# Patient Record
Sex: MALE | Race: WHITE | NOT HISPANIC OR LATINO | Employment: UNEMPLOYED | URBAN - METROPOLITAN AREA
[De-identification: names, ages, dates, MRNs, and addresses within clinical notes are randomized per-mention and may not be internally consistent; named-entity substitution may affect disease eponyms.]

---

## 2023-03-10 ENCOUNTER — HOSPITAL ENCOUNTER (EMERGENCY)
Facility: HOSPITAL | Age: 43
Discharge: HOME | End: 2023-03-10
Attending: FAMILY MEDICINE
Payer: MEDICAID

## 2023-03-10 VITALS
HEART RATE: 87 BPM | SYSTOLIC BLOOD PRESSURE: 124 MMHG | BODY MASS INDEX: 25.36 KG/M2 | DIASTOLIC BLOOD PRESSURE: 89 MMHG | HEIGHT: 73 IN | WEIGHT: 191.38 LBS | RESPIRATION RATE: 18 BRPM | TEMPERATURE: 97 F | OXYGEN SATURATION: 98 %

## 2023-03-10 DIAGNOSIS — F10.920 ALCOHOLIC INTOXICATION WITHOUT COMPLICATION: Primary | ICD-10-CM

## 2023-03-10 PROCEDURE — 99283 EMERGENCY DEPT VISIT LOW MDM: CPT
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
## 2024-02-25 ENCOUNTER — HOSPITAL ENCOUNTER (INPATIENT)
Facility: HOSPITAL | Age: 44
LOS: 5 days | Discharge: HOME | DRG: 885 | End: 2024-03-01
Attending: PSYCHIATRY & NEUROLOGY | Admitting: PSYCHIATRY & NEUROLOGY
Payer: MEDICAID

## 2024-02-25 DIAGNOSIS — F32.A DEPRESSION: ICD-10-CM

## 2024-02-25 PROCEDURE — 11400000 HC PSYCH PRIVATE ROOM

## 2024-02-25 PROCEDURE — 25000003 PHARM REV CODE 250: Performed by: PSYCHIATRY & NEUROLOGY

## 2024-02-25 PROCEDURE — S4991 NICOTINE PATCH NONLEGEND: HCPCS | Performed by: PSYCHIATRY & NEUROLOGY

## 2024-02-25 RX ORDER — DIPHENHYDRAMINE HYDROCHLORIDE 50 MG/ML
50 INJECTION INTRAMUSCULAR; INTRAVENOUS EVERY 4 HOURS PRN
Status: DISCONTINUED | OUTPATIENT
Start: 2024-02-25 | End: 2024-03-01 | Stop reason: HOSPADM

## 2024-02-25 RX ORDER — ONDANSETRON 4 MG/1
4 TABLET, ORALLY DISINTEGRATING ORAL EVERY 6 HOURS PRN
Status: DISCONTINUED | OUTPATIENT
Start: 2024-02-25 | End: 2024-03-01 | Stop reason: HOSPADM

## 2024-02-25 RX ORDER — HALOPERIDOL 5 MG/ML
10 INJECTION INTRAMUSCULAR EVERY 4 HOURS PRN
Status: DISCONTINUED | OUTPATIENT
Start: 2024-02-25 | End: 2024-03-01 | Stop reason: HOSPADM

## 2024-02-25 RX ORDER — ALUMINUM HYDROXIDE, MAGNESIUM HYDROXIDE, AND SIMETHICONE 1200; 120; 1200 MG/30ML; MG/30ML; MG/30ML
30 SUSPENSION ORAL EVERY 6 HOURS PRN
Status: DISCONTINUED | OUTPATIENT
Start: 2024-02-25 | End: 2024-03-01 | Stop reason: HOSPADM

## 2024-02-25 RX ORDER — HYDROXYZINE HYDROCHLORIDE 50 MG/1
50 TABLET, FILM COATED ORAL EVERY 4 HOURS PRN
Status: DISCONTINUED | OUTPATIENT
Start: 2024-02-25 | End: 2024-03-01 | Stop reason: HOSPADM

## 2024-02-25 RX ORDER — DIPHENHYDRAMINE HCL 50 MG
50 CAPSULE ORAL EVERY 4 HOURS PRN
Status: DISCONTINUED | OUTPATIENT
Start: 2024-02-25 | End: 2024-03-01 | Stop reason: HOSPADM

## 2024-02-25 RX ORDER — TRAZODONE HYDROCHLORIDE 100 MG/1
100 TABLET ORAL NIGHTLY PRN
Status: DISCONTINUED | OUTPATIENT
Start: 2024-02-25 | End: 2024-03-01 | Stop reason: HOSPADM

## 2024-02-25 RX ORDER — ACETAMINOPHEN 325 MG/1
650 TABLET ORAL EVERY 6 HOURS PRN
Status: DISCONTINUED | OUTPATIENT
Start: 2024-02-25 | End: 2024-03-01 | Stop reason: HOSPADM

## 2024-02-25 RX ORDER — ADHESIVE BANDAGE
30 BANDAGE TOPICAL DAILY PRN
Status: DISCONTINUED | OUTPATIENT
Start: 2024-02-25 | End: 2024-03-01 | Stop reason: HOSPADM

## 2024-02-25 RX ORDER — HALOPERIDOL 5 MG/1
10 TABLET ORAL EVERY 4 HOURS PRN
Status: DISCONTINUED | OUTPATIENT
Start: 2024-02-25 | End: 2024-03-01 | Stop reason: HOSPADM

## 2024-02-25 RX ORDER — LORAZEPAM 1 MG/1
2 TABLET ORAL EVERY 4 HOURS PRN
Status: DISCONTINUED | OUTPATIENT
Start: 2024-02-25 | End: 2024-03-01 | Stop reason: HOSPADM

## 2024-02-25 RX ORDER — IBUPROFEN 200 MG
1 TABLET ORAL DAILY
Status: DISCONTINUED | OUTPATIENT
Start: 2024-02-25 | End: 2024-03-01 | Stop reason: HOSPADM

## 2024-02-25 RX ADMIN — HYDROXYZINE HYDROCHLORIDE 50 MG: 50 TABLET, FILM COATED ORAL at 08:02

## 2024-02-25 RX ADMIN — NICOTINE 1 PATCH: 21 PATCH, EXTENDED RELEASE TRANSDERMAL at 05:02

## 2024-02-25 RX ADMIN — TRAZODONE HYDROCHLORIDE 100 MG: 100 TABLET ORAL at 08:02

## 2024-02-25 NOTE — PLAN OF CARE
Problem: Adult Inpatient Plan of Care  Goal: Plan of Care Review  Outcome: Ongoing, Not Progressing  Goal: Patient-Specific Goal (Individualized)  Outcome: Ongoing, Not Progressing  Goal: Absence of Hospital-Acquired Illness or Injury  Outcome: Ongoing, Not Progressing  Goal: Optimal Comfort and Wellbeing  Outcome: Ongoing, Not Progressing  Goal: Readiness for Transition of Care  Outcome: Ongoing, Not Progressing     Problem: Activity and Energy Impairment (Depressive Signs/Symptoms)  Goal: Optimized Energy Level (Depressive Signs/Symptoms)  Outcome: Ongoing, Not Progressing     Problem: Cognitive Impairment (Depressive Signs/Symptoms)  Goal: Optimized Cognitive Function  Outcome: Ongoing, Not Progressing     Problem: Decreased Participation/Engagement (Depressive Signs/Symptoms)  Goal: Increased Participation and Engagement (Depressive Signs/Symptoms)  Outcome: Ongoing, Not Progressing     Problem: Feelings of Worthlessness, Hopelessness or Excessive Guilt (Depressive Signs/Symptoms)  Goal: Enhanced Self-Esteem and Confidence (Depressive Signs/Symptoms)  Outcome: Ongoing, Not Progressing     Problem: Mood Impairment (Depressive Signs/Symptoms)  Goal: Improved Mood Symptoms (Depressive Signs/Symptoms)  Outcome: Ongoing, Not Progressing     Problem: Nutrition Imbalance (Depressive Signs/Symptoms)  Goal: Optimized Nutrition Intake  Outcome: Ongoing, Not Progressing     Problem: Psychomotor Impairment (Depressive Signs/Symptoms)  Goal: Improved Psychomotor Symptoms (Depressive Signs/Symptoms)  Outcome: Ongoing, Not Progressing     Problem: Sleep Disturbance (Depressive Signs/Symptoms)  Goal: Improved Sleep (Depressive Signs/Symptoms)  Outcome: Ongoing, Not Progressing     Problem: Social, Occupational or Functional Impairment (Depressive Signs/Symptoms)  Goal: Enhanced Social, Occupational or Functional Skills (Depressive Signs/Symptoms)  Outcome: Ongoing, Not Progressing     Problem: Activity and Energy Impairment  (Excessive Substance Use)  Goal: Optimized Energy Level (Excessive Substance Use)  Outcome: Ongoing, Not Progressing     Problem: Behavior Regulation Impairment (Excessive Substance Use)  Goal: Improved Behavioral Control (Excessive Substance Use)  Outcome: Ongoing, Not Progressing     Problem: Decreased Participation and Engagement (Excessive Substance Use)  Goal: Increased Participation and Engagement (Excessive Substance Use)  Outcome: Ongoing, Not Progressing     Problem: Physiologic Impairment (Excessive Substance Use)  Goal: Improved Physiologic Symptoms (Excessive Substance Use)  Outcome: Ongoing, Not Progressing     Problem: Social, Occupational or Functional Impairment (Excessive Substance Use)  Goal: Enhanced Social, Occupational or Functional Skills (Excessive Substance Use)  Outcome: Ongoing, Not Progressing

## 2024-02-25 NOTE — NURSING
"Admission Note:    Dequan Woods is a 43 y.o. male, : 1980, MRN: [id number], admitted on 2024 to [facility] ([facility]) for José Miguel Moulton MD with a diagnosis of Depression [F32.A]. Patient admitted on a status of Physician Emergency Certificate (PEC). Dequan reports no known food or drug allergies.    Patient demonstrated an affect that was sad, flat, and anxious. Patient demonstrated mood during assessment that was depressed and anxious. Patient had an appearance that was disheveled and poor hygiene.  Patient endorses suicidal ideation. Patient denies suicide plan. Patient denies hallucinations.    Dequan's  height is 6' 2" (1.88 m) and weight is 100.2 kg (221 lb). His temperature is 98.2 °F (36.8 °C). His blood pressure is 136/92 (abnormal) and his pulse is 90. His respiration is 20.     Dequan's last BM was noted on: 24.    Metal detector screening performed via security personnel. The result of the scan was no contraband found. Head-to-toe physical assessment completed with the following findings: no wounds found upon body screen. A full skin assessment was performed. Dequan's skin appeared warm,dry, and intact.  Dequan was oriented to unit, staff, peers, and room. Patient belongings/valuables stored in locked intake room cabinet and changes of clothing provided to patient. Dequan was placed on Q 15 min observations.       "

## 2024-02-26 PROBLEM — F10.10 ALCOHOL ABUSE, CONTINUOUS: Status: ACTIVE | Noted: 2024-02-26

## 2024-02-26 PROBLEM — F41.9 ANXIETY DISORDER: Status: ACTIVE | Noted: 2024-02-26

## 2024-02-26 PROBLEM — F33.2 MAJOR DEPRESSIVE DISORDER, RECURRENT, SEVERE WITHOUT PSYCHOTIC FEATURES: Status: ACTIVE | Noted: 2024-02-26

## 2024-02-26 LAB
ALBUMIN SERPL-MCNC: 4 G/DL (ref 3.5–5)
ALBUMIN/GLOB SERPL: 1.4 RATIO (ref 1.1–2)
ALP SERPL-CCNC: 99 UNIT/L (ref 40–150)
ALT SERPL-CCNC: 7 UNIT/L (ref 0–55)
AST SERPL-CCNC: 18 UNIT/L (ref 5–34)
BASOPHILS # BLD AUTO: 0.05 X10(3)/MCL
BASOPHILS NFR BLD AUTO: 0.5 %
BILIRUB SERPL-MCNC: 0.7 MG/DL
BUN SERPL-MCNC: 16.3 MG/DL (ref 8.9–20.6)
CALCIUM SERPL-MCNC: 9.5 MG/DL (ref 8.4–10.2)
CHLORIDE SERPL-SCNC: 102 MMOL/L (ref 98–107)
CHOLEST SERPL-MCNC: 166 MG/DL
CHOLEST/HDLC SERPL: 3 {RATIO} (ref 0–5)
CO2 SERPL-SCNC: 25 MMOL/L (ref 22–29)
CREAT SERPL-MCNC: 0.96 MG/DL (ref 0.73–1.18)
EOSINOPHIL # BLD AUTO: 0.12 X10(3)/MCL (ref 0–0.9)
EOSINOPHIL NFR BLD AUTO: 1.3 %
ERYTHROCYTE [DISTWIDTH] IN BLOOD BY AUTOMATED COUNT: 12.1 % (ref 11.5–17)
EST. AVERAGE GLUCOSE BLD GHB EST-MCNC: 96.8 MG/DL
GFR SERPLBLD CREATININE-BSD FMLA CKD-EPI: >60 MLS/MIN/1.73/M2
GLOBULIN SER-MCNC: 2.8 GM/DL (ref 2.4–3.5)
GLUCOSE SERPL-MCNC: 88 MG/DL (ref 74–100)
HBA1C MFR BLD: 5 %
HCT VFR BLD AUTO: 46.3 % (ref 42–52)
HDLC SERPL-MCNC: 60 MG/DL (ref 35–60)
HGB BLD-MCNC: 15.9 G/DL (ref 14–18)
IMM GRANULOCYTES # BLD AUTO: 0.03 X10(3)/MCL (ref 0–0.04)
IMM GRANULOCYTES NFR BLD AUTO: 0.3 %
LDLC SERPL CALC-MCNC: 84 MG/DL (ref 50–140)
LYMPHOCYTES # BLD AUTO: 3.06 X10(3)/MCL (ref 0.6–4.6)
LYMPHOCYTES NFR BLD AUTO: 32.7 %
MCH RBC QN AUTO: 32.1 PG (ref 27–31)
MCHC RBC AUTO-ENTMCNC: 34.3 G/DL (ref 33–36)
MCV RBC AUTO: 93.5 FL (ref 80–94)
MONOCYTES # BLD AUTO: 0.84 X10(3)/MCL (ref 0.1–1.3)
MONOCYTES NFR BLD AUTO: 9 %
NEUTROPHILS # BLD AUTO: 5.26 X10(3)/MCL (ref 2.1–9.2)
NEUTROPHILS NFR BLD AUTO: 56.2 %
NRBC BLD AUTO-RTO: 0 %
PLATELET # BLD AUTO: 276 X10(3)/MCL (ref 130–400)
PMV BLD AUTO: 9.7 FL (ref 7.4–10.4)
POTASSIUM SERPL-SCNC: 4.2 MMOL/L (ref 3.5–5.1)
PROT SERPL-MCNC: 6.8 GM/DL (ref 6.4–8.3)
RBC # BLD AUTO: 4.95 X10(6)/MCL (ref 4.7–6.1)
SODIUM SERPL-SCNC: 137 MMOL/L (ref 136–145)
T PALLIDUM AB SER QL: NONREACTIVE
TRIGL SERPL-MCNC: 109 MG/DL (ref 34–140)
TSH SERPL-ACNC: 1.2 UIU/ML (ref 0.35–4.94)
VLDLC SERPL CALC-MCNC: 22 MG/DL
WBC # SPEC AUTO: 9.36 X10(3)/MCL (ref 4.5–11.5)

## 2024-02-26 PROCEDURE — 11400000 HC PSYCH PRIVATE ROOM

## 2024-02-26 PROCEDURE — 84443 ASSAY THYROID STIM HORMONE: CPT | Performed by: PSYCHIATRY & NEUROLOGY

## 2024-02-26 PROCEDURE — 80053 COMPREHEN METABOLIC PANEL: CPT | Performed by: PSYCHIATRY & NEUROLOGY

## 2024-02-26 PROCEDURE — S4991 NICOTINE PATCH NONLEGEND: HCPCS | Performed by: PSYCHIATRY & NEUROLOGY

## 2024-02-26 PROCEDURE — 25000003 PHARM REV CODE 250: Performed by: PSYCHIATRY & NEUROLOGY

## 2024-02-26 PROCEDURE — 85025 COMPLETE CBC W/AUTO DIFF WBC: CPT | Performed by: PSYCHIATRY & NEUROLOGY

## 2024-02-26 PROCEDURE — 80061 LIPID PANEL: CPT | Performed by: PSYCHIATRY & NEUROLOGY

## 2024-02-26 PROCEDURE — 86780 TREPONEMA PALLIDUM: CPT | Performed by: PSYCHIATRY & NEUROLOGY

## 2024-02-26 PROCEDURE — 83036 HEMOGLOBIN GLYCOSYLATED A1C: CPT | Performed by: PSYCHIATRY & NEUROLOGY

## 2024-02-26 RX ORDER — PRAZOSIN HYDROCHLORIDE 1 MG/1
1 CAPSULE ORAL NIGHTLY
Status: DISCONTINUED | OUTPATIENT
Start: 2024-02-26 | End: 2024-03-01 | Stop reason: HOSPADM

## 2024-02-26 RX ORDER — BUSPIRONE HYDROCHLORIDE 5 MG/1
10 TABLET ORAL 2 TIMES DAILY
Status: DISCONTINUED | OUTPATIENT
Start: 2024-02-26 | End: 2024-02-28

## 2024-02-26 RX ORDER — QUETIAPINE FUMARATE 300 MG/1
300 TABLET, FILM COATED ORAL NIGHTLY
Status: DISCONTINUED | OUTPATIENT
Start: 2024-02-26 | End: 2024-03-01 | Stop reason: HOSPADM

## 2024-02-26 RX ADMIN — SERTRALINE HYDROCHLORIDE 125 MG: 50 TABLET ORAL at 08:02

## 2024-02-26 RX ADMIN — BUSPIRONE HYDROCHLORIDE 10 MG: 5 TABLET ORAL at 08:02

## 2024-02-26 RX ADMIN — QUETIAPINE FUMARATE 300 MG: 300 TABLET ORAL at 08:02

## 2024-02-26 RX ADMIN — PRAZOSIN HYDROCHLORIDE 1 MG: 1 CAPSULE ORAL at 08:02

## 2024-02-26 RX ADMIN — TRAZODONE HYDROCHLORIDE 100 MG: 100 TABLET ORAL at 09:02

## 2024-02-26 RX ADMIN — HYDROXYZINE HYDROCHLORIDE 50 MG: 50 TABLET, FILM COATED ORAL at 05:02

## 2024-02-26 RX ADMIN — NICOTINE 1 PATCH: 21 PATCH, EXTENDED RELEASE TRANSDERMAL at 08:02

## 2024-02-26 RX ADMIN — HYDROXYZINE HYDROCHLORIDE 50 MG: 50 TABLET, FILM COATED ORAL at 08:02
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
## 2024-02-26 NOTE — PLAN OF CARE
Problem: Adult Inpatient Plan of Care  Goal: Plan of Care Review  Outcome: Ongoing, Progressing  Goal: Optimal Comfort and Wellbeing  Outcome: Ongoing, Progressing     Problem: Activity and Energy Impairment (Depressive Signs/Symptoms)  Goal: Optimized Energy Level (Depressive Signs/Symptoms)  Outcome: Ongoing, Progressing  Intervention: Optimize Energy Level  Flowsheets (Taken 2/26/2024 0852)  Activity (Behavioral Health): activity encouraged     Problem: Cognitive Impairment (Depressive Signs/Symptoms)  Goal: Optimized Cognitive Function  Outcome: Ongoing, Progressing     Problem: Decreased Participation/Engagement (Depressive Signs/Symptoms)  Goal: Increased Participation and Engagement (Depressive Signs/Symptoms)  Outcome: Ongoing, Progressing  Intervention: Facilitate Participation and Engagement  Flowsheets (Taken 2/26/2024 0852)  Supportive Measures:   active listening utilized   decision-making supported   goal-setting facilitated   self-care encouraged     Problem: Feelings of Worthlessness, Hopelessness or Excessive Guilt (Depressive Signs/Symptoms)  Goal: Enhanced Self-Esteem and Confidence (Depressive Signs/Symptoms)  Outcome: Ongoing, Progressing  Intervention: Promote Confidence and Self-Esteem  Flowsheets (Taken 2/26/2024 0852)  Supportive Measures:   active listening utilized   decision-making supported   goal-setting facilitated   self-care encouraged     Problem: Mood Impairment (Depressive Signs/Symptoms)  Goal: Improved Mood Symptoms (Depressive Signs/Symptoms)  Outcome: Ongoing, Progressing  Intervention: Promote Mood Improvement  Flowsheets (Taken 2/26/2024 0852)  Supportive Measures:   active listening utilized   decision-making supported   goal-setting facilitated   self-care encouraged     Problem: Nutrition Imbalance (Depressive Signs/Symptoms)  Goal: Optimized Nutrition Intake  Outcome: Ongoing, Progressing  Flowsheets (Taken 2/26/2024 0852)  Mutually Determined Action Steps (Optimized  Nutrition Intake):   maintains regular elimination   eats at meal time  Intervention: Promote Optimal Nutrition Intake  Flowsheets (Taken 2/26/2024 0852)  Bowel Elimination Promotion:   adequate fluid intake promoted   ambulation promoted     Problem: Psychomotor Impairment (Depressive Signs/Symptoms)  Goal: Improved Psychomotor Symptoms (Depressive Signs/Symptoms)  Outcome: Ongoing, Progressing  Intervention: Manage Psychomotor Movement  Flowsheets (Taken 2/26/2024 0852)  Activity (Behavioral Health): activity encouraged     Problem: Sleep Disturbance (Depressive Signs/Symptoms)  Goal: Improved Sleep (Depressive Signs/Symptoms)  Outcome: Ongoing, Progressing     Problem: Social, Occupational or Functional Impairment (Depressive Signs/Symptoms)  Goal: Enhanced Social, Occupational or Functional Skills (Depressive Signs/Symptoms)  Outcome: Ongoing, Progressing  Intervention: Promote Social, Occupational and Functional Ability  Flowsheets (Taken 2/26/2024 0852)  Social Functional Ability Promotion:   autonomy promoted   opportunity for activity provided     Problem: Activity and Energy Impairment (Excessive Substance Use)  Goal: Optimized Energy Level (Excessive Substance Use)  Outcome: Ongoing, Progressing

## 2024-02-26 NOTE — NURSING
"PRN Medication Administration f/u:    Dequan Woods response to medication, a decrease in anxiety. Dequan states, "It has taken the edge off a little bit".   "

## 2024-02-26 NOTE — NURSING
Daily Nursing Note:        Behavior:     Patient Dequan Woods is a 43 y.o. male, : 1980, MRN: [id number], admitted on 2024 to [facility] ([facility]) for José Miguel Moulton MD with a diagnosis of Depression [F32.A]. Patient admitted on a status of Physician Emergency Certificate (PEC). Dequan reports no known food or drug allergies.    Dequan Woods denies any suicidal or homicidal ideation. Dequan Woods reports depression and anxiety. Dequan Woods denies hallucinations no observations of delusions at this time.            Intervention:     Encourage Dequan Woods to perform self-hygiene, grooming, and changing of clothing. Monitor Dequan Woods behavior and program compliance. Monitor Dequan Woods for suicidal ideation, homicidal ideation, sleep disturbance, and hallucinations. Encourage Monitor Dequan Woods for intake and output to ensure hydration. Notify the Physician/Physician Assistant/Advance Practice Registered Nurse (MD/PA/APRN) for any medication refusal and any change in patient condition.        Response:     Dequan Woods verbalizes understand of unit process and procedures.        Plan:      Continue to monitor per MD/PA/APRN orders; give prn medication , as appropriate and maintain patient and staff safety.

## 2024-02-26 NOTE — NURSING
PRN Medication Administration f/u:    Dequan Woods response to medication relief of anxiety, resting.

## 2024-02-26 NOTE — PROGRESS NOTES
Dequan is a 43 yr. male admitted for Major Depressive Disorder, recurrent, severe (F33.2), Anxiety (F41.9), and Alcohol use disorder (F10.10) with a +uds cannabis. STRS/CTRS met with pt 1:1, Dequan was cooperative, appeared depressed reporting constant worrying and poor self esteem but reported ability to perform his ADL's. STRS/CTRS educated pt to TR group dates and times with Dequan agreeing to attend TR group. Dequan reported his treatment goal as coping skills and stress management.        02/26/24 1145   General   Admit Date 02/25/24   Primary Diagnosis Major Depressive Disorder, recurrent, severe (F33.2)  Anxiety (F41.9)   Secondary Diagnosis Alcohol use disorder (F10.10)   Mosque Rastafarian   Number of Children 2   Children Living? 2   Occupation landscaping   Does the patient have dentures? No   If you were to take part in activities, which of the following would you prefer? Both   Do you feel like you have enough to keep you busy now? Yes   Do you believe that you have the opportunity for physical activity? Yes   Activity Capabilities Moderate   Subjective   Patient states I relapsed on drinking   Assessment   Mobility ambulates independently   Transfers independently   Musculoskeletal pain  (c/o lower back pain)   Visual Acuity normal vision   Visual Perception depth perception;color perception;recognizes numbers;recognizes letters   Hearing normal   Speech/Communication normal   Cognitive Concerns oriented x4   Emotional Concerns appears depressed;poor self image;critical of self or others   Leisure Interest Survey   Leisure Interest Survey Yes   Social/Group Activities   Mosque/Jain Would like to learn/do   Restaurant Current Interest   Solitary Activities   Music Listening Current Interest   Reading Past Interest   Physical Activities   Fitness/Exercise Programs Would like to learn/do   Creative Activities   Drawing Current Interest   Cooking Current Interest   Outdoor Activities   Fishing Current  Interest   Camping Current Interest   Passive Games   Classic Board Games Current Interest   BinTopic Current Interest   Card Games Current Interest   Goals   Additional Documentation yes   Goal Formulation With patient   Time For Goal Achievement 7 days   Goal 1 coping skils and stress management   Goal 1-Progress ongoing- progressing   Plan   Planned Therapy Intervention Group Recreational Therapy   Expected Length of Stay 5-7 days   PT Frequency Minimum of 3 visits per week

## 2024-02-26 NOTE — NURSING
PRN Medication Administration:    Dequan Woods c/o anxiety and depression. Dequan states he takes Seroquel to sleep at home. PRN trazodone 100 mg tablet and hydroxyzine 50 mg tablet administered per physician order. Dequan Chuck tolerated administration. Monitor for effectiveness of medication and f/u.

## 2024-02-26 NOTE — H&P
[facility] - Behavioral Health Unit  History & Physical    Subjective:      Chief Complaint/Reason for Admission: major depression/ SI     Dequan Woods is a 43 y.o. male. Suicidal ideation / alcohol abuse     No past medical history on file.  No past surgical history on file.  No family history on file.       No medications prior to admission.     Review of patient's allergies indicates:  No Known Allergies     Review of Systems   Constitutional: Negative.    HENT: Negative.     Eyes: Negative.    Respiratory: Negative.     Cardiovascular: Negative.    Gastrointestinal: Negative.    Genitourinary: Negative.    Musculoskeletal: Negative.    Skin: Negative.    Neurological: Negative.    Endo/Heme/Allergies: Negative.    Psychiatric/Behavioral:  Positive for depression, substance abuse and suicidal ideas. Negative for hallucinations.        Objective:      Vital Signs (Most Recent)  Temp: 97.3 °F (36.3 °C) (02/25/24 1915)  Pulse: 82 (02/25/24 1915)  Resp: 18 (02/25/24 1915)  BP: (!) 143/89 (02/25/24 1915)  SpO2: 95 % (02/25/24 1915)    Vital Signs Range (Last 24H):  Temp:  [97.3 °F (36.3 °C)-98.2 °F (36.8 °C)]   Pulse:  [82-90]   Resp:  [18-20]   BP: (136-143)/(89-92)   SpO2:  [95 %]     Physical Exam  HENT:      Head: Normocephalic.      Right Ear: Tympanic membrane normal.      Left Ear: Tympanic membrane normal.      Nose: Nose normal.      Mouth/Throat:      Mouth: Mucous membranes are moist.   Eyes:      Extraocular Movements: Extraocular movements intact.      Pupils: Pupils are equal, round, and reactive to light.   Cardiovascular:      Rate and Rhythm: Normal rate and regular rhythm.   Pulmonary:      Effort: Pulmonary effort is normal.   Abdominal:      General: Abdomen is flat.   Musculoskeletal:         General: Normal range of motion.   Skin:     General: Skin is warm.   Neurological:      General: No focal deficit present.      Mental Status: He is alert and oriented to person, place, and  time.      Comments: Vision normal   Hearing normal   EOM intact   Face muscles normal  Facial sensation normal   Shrugs shoulders  Tongue midline            Data Review:    Recent Results (from the past 48 hour(s))   ETHANOL,U    Collection Time: 02/25/24 11:47 AM   Result Value Ref Range    Alcohol Scrn 25.1 (H) 0.0 - 10.0 MG/DL        No results found.       Assessment and Plan       Major depression with suicidal ideation/ alcohol abuse

## 2024-02-26 NOTE — NURSING
"Nursing assessment completed and charted. Dequan Woods denies needs at this time. Dequan Woods denies command hallucinations. Dequan Woods verbalizes anxiety and depression. Dequan states he feels "down and out, a little bit of both right now". Dequan Woods denies suicidal ideation and homicidal ideation. Dequan Woods verbalizes understanding of unit process and procedures. Maintain patient safety and monitor per MD/PA/APRN orders.  "

## 2024-02-26 NOTE — NURSING
PRN Medication Administration:     Dequan Woods c/o anxiety.Hydroxyzine 50 mg tablet administered per physician order. Dequan tolerated administration. Monitor for effectiveness of medication and f/u.

## 2024-02-26 NOTE — PLAN OF CARE
Behavioral Health Unit  Psychosocial History and Assessment  Progress Note      Patient Name: Dequan Woods YOB: 1980 SW: Karen Dejesus Date: 2/26/2024    Chief Complaint: addictive disorder and depression    Consent:     Did the patient consent for an interview with the ? Yes    Did the patient consent for the  to contact family/friend/caregiver?   Yes  Name: Marianela White, Relationship: wife, and Contact: [phone]    Did the patient give consent for the  to inform family/friend/caregiver of his/her whereabouts or to discuss discharge planning? Yes    Source of Information: Face to face with patient    Is information obtained from interviews considered reliable?   yes    Reason for Admission:     Active Hospital Problems    Diagnosis  POA    *Major depressive disorder, recurrent, severe without psychotic features [F33.2]  Unknown    Anxiety disorder [F41.9]  Unknown    Alcohol abuse, continuous [F10.10]  Unknown      Resolved Hospital Problems   No resolved problems to display.       History of Present Illness - (Patient Perception):   I had gotten a good job after I got out of rehab; after three weeks I bought a half pint. My wife found out about it and kicked me out of the house, so I didn't have anywhere to go. I wanted to get help before it got worse. This time I was drinking about a half pint every other day where as last time it was about a 5th a day. I started having really bad depression and feeling worthless.     Present biopsychosocial functioning: depressed    Past biopsychosocial functioning: history of opioid abuse and etoh abuse    Family and Marital/Relationship History:     Significant Other/Partner Relationships:  : common law for 10 years; 2 stepsons and 2 biological    Family Relationships: Intact      Childhood History:     Where was patient raised? [place]    Who raised the patient? dad      How does patient describe  their childhood? It was pretty good, dad was a little strict, just tough love; mom was on drugs siblings were adopted      Who is patient's primary support person? isabel      Culture and Hinduism:     Hinduism: Latter day    How strong of a role does Yazidi and spirituality play in patient's life? Pretty strong    Jainism or spiritual concerns regarding treatment: observation of holy days  and spiritual concerns / distress    History of Abuse:   History of Abuse: Denies      Outcome: denies    Psychiatric and Medical History:     History of psychiatric illness or treatment: prior inpatient treatment, psychotropic management by PCP, has participated in counseling/psychotherapy on an outpatient basis in the past, and [facility] [place]    Medical history: No past medical history on file.    Substance Abuse History:     Alcohol - (Patient Perspective): pt states that he was sober for 3 months, and relapsed about 3 weeks ago. He stated that he was drinking a pint every two days  Social History     Substance and Sexual Activity   Alcohol Use None       Drugs - (Patient Perspective): pt states that he smokes marijuana daily  Social History     Substance and Sexual Activity   Drug Use Not on file       Education:     Currently Enrolled? No  Attended College/Technical School    Special Education? No    Interested in Completing Education/GED: No    Employment and Financial:     Currently employed? Employed: Current Occupation: [employer]    Source of Income: salary    Able to afford basic needs (food, shelter, utilities)? Yes    Who manages finances/personal affairs? self      Service:     [place]? no    Combat Service? No     Community Resources:     Describe present use of community resources: inpatient and outpatient mental health     Identify previously used community resources   (Include previous mental health treatment - outpatient and inpatient): inpatient and outpatient mental  health    Environmental:     Current living situation:Lives with spouse    Social Evaluation:     Patient Assets: General fund of knowledge, Motivation for treatment/growth, Work skills, Physical health, and Ability for insight    Patient Limitations: poor coping skills    High risk psychosocial issues that may impact discharge planning:   None at this time    Recommendations:     Anticipated discharge plan:   outpatient follow up; rehab    High risk issues requiring early treatment planning and immediate intervention: none at this time    Community resources needed for discharge planning:  aftercare treatment sources    Anticipated social work role(s) in treatment and discharge planning: advice and [place], groups, individual as needed, referral to aftercare.    02/26/24 0842   Initial Information   Source of Information patient   Reason for Admission etoh abuse   Arrived From emergency department   Spiritual Beliefs   Spiritual, Cultural Beliefs, Muslim Practices, Values that Affect Care yes   Description of Beliefs that Will Affect Care Jewish   Substance Use/Withdrawal   Substance Use Current, used prior to admission   Additional Tobacco Use   How many cigarettes do you typically have per day? 10   Abuse Screen (yes response referral indicated)   Feels Unsafe at Home or Work/School no   Feels Threatened by Someone no   Does anyone try to keep you from having contact with others or doing things outside your home? no   Physical Signs of Abuse Present no   Abuse Details   Physical Abuse No   Sexual Abuse No   Emotional Abuse No   If applicable, has the abuse been reported? No   AUDIT-C (Alcohol Use Disorders ID Test)   Alcohol Use In Past Year 3-->two to three times per week   Alcohol Amount Per Day In Past Year 2-->five or six   More Than 6 Drinks On One Occasion In Past Year 3-->weekly   Total Audit C Score 8

## 2024-02-27 PROCEDURE — 11400000 HC PSYCH PRIVATE ROOM

## 2024-02-27 PROCEDURE — S4991 NICOTINE PATCH NONLEGEND: HCPCS | Performed by: PSYCHIATRY & NEUROLOGY

## 2024-02-27 PROCEDURE — 25000003 PHARM REV CODE 250: Performed by: PSYCHIATRY & NEUROLOGY

## 2024-02-27 RX ADMIN — HYDROXYZINE HYDROCHLORIDE 50 MG: 50 TABLET, FILM COATED ORAL at 08:02

## 2024-02-27 RX ADMIN — BUSPIRONE HYDROCHLORIDE 10 MG: 5 TABLET ORAL at 08:02

## 2024-02-27 RX ADMIN — QUETIAPINE FUMARATE 300 MG: 300 TABLET ORAL at 08:02

## 2024-02-27 RX ADMIN — PRAZOSIN HYDROCHLORIDE 1 MG: 1 CAPSULE ORAL at 08:02

## 2024-02-27 RX ADMIN — NICOTINE 1 PATCH: 21 PATCH, EXTENDED RELEASE TRANSDERMAL at 08:02

## 2024-02-27 RX ADMIN — TRAZODONE HYDROCHLORIDE 100 MG: 100 TABLET ORAL at 08:02

## 2024-02-27 RX ADMIN — SERTRALINE HYDROCHLORIDE 125 MG: 50 TABLET ORAL at 08:02

## 2024-02-27 NOTE — NURSING
"PRN Medication Follow-up Note:    Behavior:    Patient (Dequan Woods is a 43 y.o. male, : 1980, MRN: [id number])     Allergies: Patient has no known allergies.    Dequan's  height is 6' 2" (1.88 m) and weight is 100.2 kg (221 lb). His temperature is 98.1 °F (36.7 °C). His blood pressure is 146/88 (abnormal) and his pulse is 79. His respiration is 16 and oxygen saturation is 97%.     Administered atarax_______ per physician order to Dequan       Intervention:    Intervention to Dequan's response: none needed_________.       Response:    Dequan's response: no furthr c/o voiced_________      Plan:     Continue to monitor per MD/PA/APRN orders; and reevaluate medication effectiveness within 30 minutes.   "

## 2024-02-27 NOTE — PLAN OF CARE
Problem: Adult Inpatient Plan of Care  Goal: Plan of Care Review  Outcome: Ongoing, Progressing  Goal: Patient-Specific Goal (Individualized)  Outcome: Ongoing, Progressing  Goal: Absence of Hospital-Acquired Illness or Injury  Outcome: Ongoing, Progressing  Goal: Optimal Comfort and Wellbeing  Outcome: Ongoing, Progressing  Goal: Readiness for Transition of Care  Outcome: Ongoing, Progressing     Problem: Activity and Energy Impairment (Depressive Signs/Symptoms)  Goal: Optimized Energy Level (Depressive Signs/Symptoms)  Outcome: Ongoing, Progressing     Problem: Cognitive Impairment (Depressive Signs/Symptoms)  Goal: Optimized Cognitive Function  Outcome: Ongoing, Progressing     Problem: Decreased Participation/Engagement (Depressive Signs/Symptoms)  Goal: Increased Participation and Engagement (Depressive Signs/Symptoms)  Outcome: Ongoing, Progressing     Problem: Feelings of Worthlessness, Hopelessness or Excessive Guilt (Depressive Signs/Symptoms)  Goal: Enhanced Self-Esteem and Confidence (Depressive Signs/Symptoms)  Outcome: Ongoing, Progressing     Problem: Mood Impairment (Depressive Signs/Symptoms)  Goal: Improved Mood Symptoms (Depressive Signs/Symptoms)  Outcome: Ongoing, Progressing     Problem: Nutrition Imbalance (Depressive Signs/Symptoms)  Goal: Optimized Nutrition Intake  Outcome: Ongoing, Progressing     Problem: Psychomotor Impairment (Depressive Signs/Symptoms)  Goal: Improved Psychomotor Symptoms (Depressive Signs/Symptoms)  Outcome: Ongoing, Progressing     Problem: Sleep Disturbance (Depressive Signs/Symptoms)  Goal: Improved Sleep (Depressive Signs/Symptoms)  Outcome: Ongoing, Progressing     Problem: Social, Occupational or Functional Impairment (Depressive Signs/Symptoms)  Goal: Enhanced Social, Occupational or Functional Skills (Depressive Signs/Symptoms)  Outcome: Ongoing, Progressing     Problem: Activity and Energy Impairment (Excessive Substance Use)  Goal: Optimized Energy Level  (Excessive Substance Use)  Outcome: Ongoing, Progressing     Problem: Behavior Regulation Impairment (Excessive Substance Use)  Goal: Improved Behavioral Control (Excessive Substance Use)  Outcome: Ongoing, Progressing     Problem: Decreased Participation and Engagement (Excessive Substance Use)  Goal: Increased Participation and Engagement (Excessive Substance Use)  Outcome: Ongoing, Progressing     Problem: Physiologic Impairment (Excessive Substance Use)  Goal: Improved Physiologic Symptoms (Excessive Substance Use)  Outcome: Ongoing, Progressing     Problem: Social, Occupational or Functional Impairment (Excessive Substance Use)  Goal: Enhanced Social, Occupational or Functional Skills (Excessive Substance Use)  Outcome: Ongoing, Progressing

## 2024-02-27 NOTE — GROUP NOTE
Group Psychotherapy       Group Focus: Promoting Healthy Lifestyles   Group topic: Treatment Planning. Therapist explored patients need for identifying personal strengths and qualities in working towards mental health goals. Patients were educated on discharge planning and engaged in open discussion about how effective discharge planning can help decrease potential for inpatient treatment.      Number of patients in attendance: 4    Group Start Time: 1045  Group End Time:  1130  Groups Date: 2/26/2024  Group Topic:  Behavioral Health  Group Department: [facility] - Behavioral Health Unit  Group Facilitators:  Mag Campos MSW  _____________________________________________________________________    Patient Name: Dequan Woods  MRN: [id number]  Patient Class: IP- Psych   Admission Date\Time: 2/25/2024  3:00 PM  Hospital Length of Stay: 2  Primary Care Provider: Miranda Alas MD     Referred by: Acute Psychiatry Unit Treatment Team     Target symptoms: Alcohol Abuse, Depression, Anxiety, and Poor Coping Skills     Patient's response to treatment: Active Listening, Self-disclosure, and Frequent Questions     Progress toward goals: Progressing slowly     Interval History:      Diagnosis:      Plan: Continue treatment on [facility]

## 2024-02-27 NOTE — NURSING
"Daily Nursing Note:      Behavior:    Patient (Dequan Woods is a 43 y.o. male, : 1980, MRN: [id number]) demonstrating an affect that was flat and anxious. Dequan demonstrating mood that is anxious. Dequan had an appearance that was disheveled. Dequan denies suicidal ideation. Dequan denies suicide plan. Dequan denies homicidal ideation. Dequan denies hallucinations.    Dequan's  height is 6' 2" (1.88 m) and weight is 100.2 kg (221 lb). His temperature is 98.1 °F (36.7 °C). His blood pressure is 146/88 (abnormal) and his pulse is 79. His respiration is 16 and oxygen saturation is 97%.     Dequan's last BM was noted on: _______      Intervention:    Encourage Dequan to perform self-hygiene, grooming, and changing of clothing. Monitor Dequan's behavior and program compliance. Monitor Dequan for suicidal ideation, homicidal ideation, sleep disturbance, and hallucinations. Encourage Dequan to eat all portions of meals and assess for meal preferences. Monitor Dequan for intake and output to ensure hydration. Notify the Physician/Physician Assistant/Advance Practice Registered Nurse (MD/PA/APRN) for any medication refusal and any change in patient condition.      Response:    Dequan verbalizes understand of unit process and procedures. Dequan reported medications ______.      Plan:     Continue to monitor per MD/PA/APRN orders; maintain patient safety.   "

## 2024-02-27 NOTE — PROGRESS NOTES
02/27/24 1000   [facility] Group Therapy   Group Name Therapeutic Recreation   Specific Interventions Skilled Activity Mild Exercises   Participation Level None   Participation Quality Refused

## 2024-02-27 NOTE — PROGRESS NOTES
"2/27/2024  Dequan Woods   1980   [id number]        Psychiatry Progress Note     Chief Complaint: Im OK    SUBJECTIVE:   Dequan Woods is a 43 y.o. male  placed under a PEC at [facility] due to reported suicidal ideation.     Today patient stated that he is feeling better but was initially irritated. I was able to talk with him and he endorsed that he was having some self defeating thoughts. He endorsed feeling down because of his decisions. He does not currently show signs of withdrawal. BP somewhat elevated but other VSS. No tremors, sweating, or GI issues. Patient states that he is sleeping and eating "fine". Mood remains low and affect is constricted. Will continue with current POC and monitor for mood improvements.    Patient interested in both rehab and sober living. Will follow up with staff on placement.        Current Medications:   Scheduled Meds:    busPIRone  10 mg Oral BID    nicotine  1 patch Transdermal Daily    prazosin  1 mg Oral QHS    QUEtiapine  300 mg Oral QHS    sertraline  125 mg Oral Daily      PRN Meds: acetaminophen, aluminum-magnesium hydroxide-simethicone, haloperidoL **AND** diphenhydrAMINE **AND** LORazepam **AND** haloperidol lactate **AND** diphenhydrAMINE, hydrOXYzine HCL, magnesium hydroxide 400 mg/5 ml, ondansetron, traZODone   Psychotherapeutics (From admission, onward)      Start     Stop Route Frequency Ordered    02/26/24 2100  QUEtiapine tablet 300 mg         -- Oral Nightly 02/26/24 0741    02/26/24 0900  sertraline tablet 125 mg         -- Oral Daily 02/26/24 0741    02/26/24 0900  busPIRone tablet 10 mg         -- Oral 2 times daily 02/26/24 0741    02/25/24 1507  haloperidoL tablet 10 mg  (Med - Acute  Behavioral Management)        See Hyperspace for full Linked Orders Report.    -- Oral Every 4 hours PRN 02/25/24 1507    02/25/24 1507  LORazepam tablet 2 mg  (Med - Acute  Behavioral Management)        See Hyperspace for full Linked Orders Report.    -- Oral Every " 4 hours PRN 02/25/24 1507    02/25/24 1507  haloperidol lactate injection 10 mg  (Med - Acute  Behavioral Management)        See Hyperspace for full Linked Orders Report.    -- IM Every 4 hours PRN 02/25/24 1507    02/25/24 1507  traZODone tablet 100 mg         -- Oral Nightly PRN 02/25/24 1507            Allergies:   Review of patient's allergies indicates:  No Known Allergies     OBJECTIVE:   Vitals   Vitals:    02/27/24 0701   BP: (!) 144/87   Pulse: 83   Resp: 18   Temp: 97.7 °F (36.5 °C)        Labs/Imaging/Studies:   Recent Results (from the past 36 hour(s))   Hemoglobin A1C    Collection Time: 02/26/24  7:38 AM   Result Value Ref Range    Hemoglobin A1c 5.0 <=7.0 %    Estimated Average Glucose 96.8 mg/dL   Comprehensive Metabolic Panel    Collection Time: 02/26/24  7:38 AM   Result Value Ref Range    Sodium Level 137 136 - 145 mmol/L    Potassium Level 4.2 3.5 - 5.1 mmol/L    Chloride 102 98 - 107 mmol/L    Carbon Dioxide 25 22 - 29 mmol/L    Glucose Level 88 74 - 100 mg/dL    Blood Urea Nitrogen 16.3 8.9 - 20.6 mg/dL    Creatinine 0.96 0.73 - 1.18 mg/dL    Calcium Level Total 9.5 8.4 - 10.2 mg/dL    Protein Total 6.8 6.4 - 8.3 gm/dL    Albumin Level 4.0 3.5 - 5.0 g/dL    Globulin 2.8 2.4 - 3.5 gm/dL    Albumin/Globulin Ratio 1.4 1.1 - 2.0 ratio    Bilirubin Total 0.7 <=1.5 mg/dL    Alkaline Phosphatase 99 40 - 150 unit/L    Alanine Aminotransferase 7 0 - 55 unit/L    Aspartate Aminotransferase 18 5 - 34 unit/L    eGFR >60 mls/min/1.73/m2   Lipid Panel    Collection Time: 02/26/24  7:38 AM   Result Value Ref Range    Cholesterol Total 166 <=200 mg/dL    HDL Cholesterol 60 35 - 60 mg/dL    Triglyceride 109 34 - 140 mg/dL    Cholesterol/HDL Ratio 3 0 - 5    Very Low Density Lipoprotein 22     LDL Cholesterol 84.00 50.00 - 140.00 mg/dL   SYPHILIS ANTIBODY (WITH REFLEX RPR)    Collection Time: 02/26/24  7:38 AM   Result Value Ref Range    Syphilis Antibody Nonreactive Nonreactive, Equivocal   TSH    Collection  Time: 02/26/24  7:38 AM   Result Value Ref Range    TSH 1.200 0.350 - 4.940 uIU/mL   CBC with Differential    Collection Time: 02/26/24  7:38 AM   Result Value Ref Range    WBC 9.36 4.50 - 11.50 x10(3)/mcL    RBC 4.95 4.70 - 6.10 x10(6)/mcL    Hgb 15.9 14.0 - 18.0 g/dL    Hct 46.3 42.0 - 52.0 %    MCV 93.5 80.0 - 94.0 fL    MCH 32.1 (H) 27.0 - 31.0 pg    MCHC 34.3 33.0 - 36.0 g/dL    RDW 12.1 11.5 - 17.0 %    Platelet 276 130 - 400 x10(3)/mcL    MPV 9.7 7.4 - 10.4 fL    Neut % 56.2 %    Lymph % 32.7 %    Mono % 9.0 %    Eos % 1.3 %    Basophil % 0.5 %    Lymph # 3.06 0.6 - 4.6 x10(3)/mcL    Neut # 5.26 2.1 - 9.2 x10(3)/mcL    Mono # 0.84 0.1 - 1.3 x10(3)/mcL    Eos # 0.12 0 - 0.9 x10(3)/mcL    Baso # 0.05 <=0.2 x10(3)/mcL    IG# 0.03 0 - 0.04 x10(3)/mcL    IG% 0.3 %    NRBC% 0.0 %          Medical Review Of Systems:  A comprehensive review of systems was negative.      Psychiatric Mental Status Exam:  General Appearance: appears stated age, well-developed, well-nourished  Arousal: alert  Behavior: cooperative  Movements and Motor Activity: no abnormal involuntary movements noted  Orientation: oriented to person, place, time, and situation  Speech: normal rate, normal rhythm, normal volume, normal tone  Mood: Depressed  Affect: mood-congruent, constricted  Thought Process: linear  Associations: intact  Thought Content and Perceptions: (+) recent suicidal ideation, no homicidal ideation, no auditory hallucinations, no visual hallucinations, no paranoid ideation, no ideas of reference  Recent and Remote Memory: recent memory intact, remote memory intact; per interview/observation with patient  Attention and Concentration: intact, attentive to conversation; per interview/observation with patient  Fund of Knowledge: intact, aware of current events, vocabulary appropriate; based on history, vocabulary, fund of knowledge, syntax, grammar, and content  Insight: fair; based on understanding of severity of illness and  HPI  Judgment: fair; based on patient's behavior and HPI    ASSESSMENT/PLAN:   Problems Addressed/Diagnoses:  Major Depressive Disorder, recurrent, severe (F33.2)  Anxiety (F41.9)  Alcohol use disorder (F10.10)    No past medical history on file.     Plan:  Depression, chronic with acute exacerbation  -Continue Zoloft  -Continue Seroquel     Anxiety  -Buspar 10mg BID     Alcohol use, chronic with acute exacerbation  -Group/Individual psychotherapy  -Will monitor     -Ok to continue prazosin    Expected Disposition Plan: Substance treatment program        Doug DUMAS-BC

## 2024-02-27 NOTE — NURSING
"PRN Administration Note:    Behavior:    Patient (Dequan Woods is a 43 y.o. male, : 1980, MRN: [id number])     Allergies: Patient has no known allergies.    Dequan's  height is 6' 2" (1.88 m) and weight is 100.2 kg (221 lb). His temperature is 98.1 °F (36.7 °C). His blood pressure is 146/88 (abnormal) and his pulse is 79. His respiration is 16 and oxygen saturation is 97%.     Reason for PRN Administration: anxiety_________.    Intervention:    Administered atarax_______ per physician order to Dequan       Response:    Dequan tolerated administration well.      Plan:     Continue to monitor per MD/PA/APRN orders; and reevaluate medication effectiveness within 30 minutes.   "

## 2024-02-27 NOTE — NURSING
"Daily Nursing Note:      Behavior:    Patient (Dequan Woods is a 43 y.o. male, : 1980, MRN: [id number]) demonstrating an affect that was flat and anxious. Dequan demonstrating mood that is depressed and anxious. Dequan had an appearance that was disheveled. Dequan denies suicidal ideation. Dequan denies suicide plan. Dequan denies homicidal ideation. Dequan denies hallucinations.    Dequan's  height is 6' 2" (1.88 m) and weight is 100.2 kg (221 lb). His oral temperature is 98.1 °F (36.7 °C). His blood pressure is 154/93 (abnormal) and his pulse is 63. His respiration is 18 and oxygen saturation is 95%.     Dequan's last BM was noted on: 24.      Intervention:    Encourage Dequan to perform self-hygiene, grooming, and changing of clothing. Monitor Dequan's behavior and program compliance. Monitor Dequan for suicidal ideation, homicidal ideation, sleep disturbance, and hallucinations. Encourage Dequan to eat all portions of meals and assess for meal preferences. Monitor Dequan for intake and output to ensure hydration. Notify the Physician/Physician Assistant/Advance Practice Registered Nurse (MD/PA/APRN) for any medication refusal and any change in patient condition.      Response:    Dequan verbalizes understand of unit process and procedures.       Plan:     Continue to monitor per MD/PA/APRN orders; maintain patient safety.    "

## 2024-02-28 PROCEDURE — 11400000 HC PSYCH PRIVATE ROOM

## 2024-02-28 PROCEDURE — 25000003 PHARM REV CODE 250: Performed by: PSYCHIATRY & NEUROLOGY

## 2024-02-28 RX ORDER — BUSPIRONE HYDROCHLORIDE 7.5 MG/1
15 TABLET ORAL 2 TIMES DAILY
Status: DISCONTINUED | OUTPATIENT
Start: 2024-02-28 | End: 2024-03-01 | Stop reason: HOSPADM

## 2024-02-28 RX ADMIN — SERTRALINE HYDROCHLORIDE 125 MG: 50 TABLET ORAL at 08:02

## 2024-02-28 RX ADMIN — TRAZODONE HYDROCHLORIDE 100 MG: 100 TABLET ORAL at 08:02

## 2024-02-28 RX ADMIN — HYDROXYZINE HYDROCHLORIDE 50 MG: 50 TABLET, FILM COATED ORAL at 08:02

## 2024-02-28 RX ADMIN — PRAZOSIN HYDROCHLORIDE 1 MG: 1 CAPSULE ORAL at 08:02

## 2024-02-28 RX ADMIN — BUSPIRONE HYDROCHLORIDE 15 MG: 7.5 TABLET ORAL at 08:02

## 2024-02-28 RX ADMIN — QUETIAPINE FUMARATE 300 MG: 300 TABLET ORAL at 08:02

## 2024-02-28 NOTE — PROGRESS NOTES
"2/28/2024  Dequan Woods   1980   [id number]        Psychiatry Progress Note     Chief Complaint: "Pretty good"    SUBJECTIVE:   Dequan Woods is a 43 y.o. male placed under a PEC at [facility] due to reported suicidal ideation.     Staff reports that his preference now is to go to [facility] but will go to residential rehab if needed.  Does still report some anxiety.  Vital signs have been stable.  No overt behavioral issues reported by staff.  Tolerating current medication regimen without issues.  Will make these adjustments and will monitor progress.     UDS: (+)cannabis  Blood alcohol: 25.1    Current Medications:   Scheduled Meds:    busPIRone  10 mg Oral BID    nicotine  1 patch Transdermal Daily    prazosin  1 mg Oral QHS    QUEtiapine  300 mg Oral QHS    sertraline  125 mg Oral Daily      PRN Meds: acetaminophen, aluminum-magnesium hydroxide-simethicone, haloperidoL **AND** diphenhydrAMINE **AND** LORazepam **AND** haloperidol lactate **AND** diphenhydrAMINE, hydrOXYzine HCL, magnesium hydroxide 400 mg/5 ml, ondansetron, traZODone   Psychotherapeutics (From admission, onward)      Start     Stop Route Frequency Ordered    02/26/24 2100  QUEtiapine tablet 300 mg         -- Oral Nightly 02/26/24 0741    02/26/24 0900  sertraline tablet 125 mg         -- Oral Daily 02/26/24 0741    02/26/24 0900  busPIRone tablet 10 mg         -- Oral 2 times daily 02/26/24 0741    02/25/24 1507  haloperidoL tablet 10 mg  (Med - Acute  Behavioral Management)        See Hyperspace for full Linked Orders Report.    -- Oral Every 4 hours PRN 02/25/24 1507    02/25/24 1507  LORazepam tablet 2 mg  (Med - Acute  Behavioral Management)        See Hyperspace for full Linked Orders Report.    -- Oral Every 4 hours PRN 02/25/24 1507    02/25/24 1507  haloperidol lactate injection 10 mg  (Med - Acute  Behavioral Management)        See Hyperspace for full Linked Orders Report.    -- IM Every 4 hours PRN 02/25/24 1507    " "02/25/24 1507  traZODone tablet 100 mg         -- Oral Nightly PRN 02/25/24 1507            Allergies:   Review of patient's allergies indicates:  No Known Allergies     OBJECTIVE:   Vitals   Vitals:    02/27/24 1901   BP: (!) 146/97   Pulse: 97   Resp: 18   Temp: 98.2 °F (36.8 °C)        Labs/Imaging/Studies:   No results found for this or any previous visit (from the past 36 hour(s)).       Medical Review Of Systems:  Constitutional: negative  Respiratory: negative  Cardiovascular: negative  Gastrointestinal: negative  Genitourinary:negative  Musculoskeletal:negative  Neurological: negative       Psychiatric Mental Status Exam:  General Appearance: appears stated age, well-developed, well-nourished  Arousal: alert  Behavior: cooperative  Movements and Motor Activity: no abnormal involuntary movements noted  Orientation: oriented to person, place, time, and situation  Speech: normal rate, normal rhythm, normal volume, normal tone  Mood: "Pretty good"  Affect: constricted  Thought Process: linear  Associations: intact  Thought Content and Perceptions: suicidal ideation improving, no homicidal ideation, no auditory hallucinations, no visual hallucinations, no paranoid ideation, no ideas of reference  Recent and Remote Memory: recent memory intact, remote memory intact; per interview/observation with patient  Attention and Concentration: intact, attentive to conversation; per interview/observation with patient  Fund of Knowledge: intact, aware of current events, vocabulary appropriate; based on history, vocabulary, fund of knowledge, syntax, grammar, and content  Insight: questionable; based on understanding of severity of illness and HPI  Judgment: questionable; based on patient's behavior and HPI    ASSESSMENT/PLAN:   Problems Addressed/Diagnoses:  Major Depressive Disorder, recurrent, severe (F33.2)  Anxiety (F41.9)  Alcohol use disorder (F10.10)    No past medical history on file.     Plan:  Depression, chronic " with acute exacerbation  -Continue Zoloft  -Continue Seroquel     Anxiety (F41.9)  -Increase Buspar 15mg BID     Alcohol use, chronic with acute exacerbation  -Group/Individual psychotherapy  -Will monitor     -Ok to continue prazosin    Expected Disposition Plan:  Sober living        José Miguel Moulton M.D.

## 2024-02-28 NOTE — NURSING
"PRN Administration Note:    Behavior:    Patient (Dequan Woods is a 43 y.o. male, : 1980, MRN: [id number])     Allergies: Patient has no known allergies.    Dequan's  height is 6' 2" (1.88 m) and weight is 100.2 kg (221 lb). His oral temperature is 98.2 °F (36.8 °C). His blood pressure is 146/97 (abnormal) and his pulse is 97. His respiration is 18 and oxygen saturation is 99%.     Reason for PRN Administration: Insomnia and anxiety_.    Intervention:    Administered Trazodone 100 mg po and hydroxyzine 50 mg po per physician order to Dequan       Response:    Dequan tolerated administration well.      Plan:     Continue to monitor per MD/PA/APRN orders; and reevaluate medication effectiveness within 30 minutes.   "

## 2024-02-28 NOTE — NURSING
"Daily Nursing Note:      Behavior:    Patient (Dequan Woods is a 43 y.o. male, : 1980, MRN: [id number]) demonstrating an affect that was anxious. Dequan demonstrating mood that is anxious. Dequan had an appearance that was clean. Dequan denies suicidal ideation. Dequan denies suicide plan. Dequan denies homicidal ideation. Dequan denies hallucinations.    Dequan's  height is 6' 2" (1.88 m) and weight is 100.2 kg (221 lb). His temperature is 97.5 °F (36.4 °C). His blood pressure is 154/70 (abnormal) and his pulse is 72. His respiration is 16 and oxygen saturation is 96%.       Intervention:    Encourage Dequan to perform self-hygiene, grooming, and changing of clothing. Monitor Dequan's behavior and program compliance. Monitor Dequan for suicidal ideation, homicidal ideation, sleep disturbance, and hallucinations. Encourage Dequan to eat all portions of meals and assess for meal preferences. Monitor Dequan for intake and output to ensure hydration. Notify the Physician/Physician Assistant/Advance Practice Registered Nurse (MD/PA/APRN) for any medication refusal and any change in patient condition.      Response:    Dequan verbalizes understand of unit process and procedures.      Plan:     Continue to monitor per MD/PA/APRN orders; maintain patient safety.   "

## 2024-02-28 NOTE — PLAN OF CARE
Problem: Adult Inpatient Plan of Care  Goal: Plan of Care Review  Outcome: Ongoing, Progressing  Goal: Patient-Specific Goal (Individualized)  Outcome: Ongoing, Progressing  Goal: Absence of Hospital-Acquired Illness or Injury  Outcome: Ongoing, Progressing  Goal: Optimal Comfort and Wellbeing  Outcome: Ongoing, Progressing  Goal: Readiness for Transition of Care  Outcome: Ongoing, Progressing     Problem: Activity and Energy Impairment (Depressive Signs/Symptoms)  Goal: Optimized Energy Level (Depressive Signs/Symptoms)  Outcome: Ongoing, Progressing     Problem: Cognitive Impairment (Depressive Signs/Symptoms)  Goal: Optimized Cognitive Function  Outcome: Ongoing, Progressing     Problem: Decreased Participation/Engagement (Depressive Signs/Symptoms)  Goal: Increased Participation and Engagement (Depressive Signs/Symptoms)  Outcome: Ongoing, Progressing     Problem: Feelings of Worthlessness, Hopelessness or Excessive Guilt (Depressive Signs/Symptoms)  Goal: Enhanced Self-Esteem and Confidence (Depressive Signs/Symptoms)  Outcome: Ongoing, Progressing     Problem: Mood Impairment (Depressive Signs/Symptoms)  Goal: Improved Mood Symptoms (Depressive Signs/Symptoms)  Outcome: Ongoing, Progressing     Problem: Nutrition Imbalance (Depressive Signs/Symptoms)  Goal: Optimized Nutrition Intake  Outcome: Ongoing, Progressing     Problem: Psychomotor Impairment (Depressive Signs/Symptoms)  Goal: Improved Psychomotor Symptoms (Depressive Signs/Symptoms)  Outcome: Ongoing, Progressing     Problem: Sleep Disturbance (Depressive Signs/Symptoms)  Goal: Improved Sleep (Depressive Signs/Symptoms)  Outcome: Ongoing, Progressing     Problem: Social, Occupational or Functional Impairment (Depressive Signs/Symptoms)  Goal: Enhanced Social, Occupational or Functional Skills (Depressive Signs/Symptoms)  Outcome: Ongoing, Progressing     Problem: Activity and Energy Impairment (Excessive Substance Use)  Goal: Optimized Energy Level  (Excessive Substance Use)  Outcome: Ongoing, Progressing     Problem: Behavior Regulation Impairment (Excessive Substance Use)  Goal: Improved Behavioral Control (Excessive Substance Use)  Outcome: Ongoing, Progressing     Problem: Decreased Participation and Engagement (Excessive Substance Use)  Goal: Increased Participation and Engagement (Excessive Substance Use)  Outcome: Ongoing, Progressing     Problem: Physiologic Impairment (Excessive Substance Use)  Goal: Improved Physiologic Symptoms (Excessive Substance Use)  Outcome: Ongoing, Progressing

## 2024-02-28 NOTE — NURSING
"PRN Medication Follow-up Note:    Behavior:    Patient (Dequan Woods is a 43 y.o. male, : 1980, MRN: [id number])     Allergies: Patient has no known allergies.    Dequan's  height is 6' 2" (1.88 m) and weight is 100.2 kg (221 lb). His oral temperature is 98.2 °F (36.8 °C). His blood pressure is 146/97 (abnormal) and his pulse is 97. His respiration is 18 and oxygen saturation is 99%.     Administered Trazodone 100 mg po and hydroxyzine 50 mg po per physician order to Dequan       Intervention:    Intervention to Dequan's response: To promote sleep and decrease  anxiety.       Response:    Dequan's response: Effective      Plan:     Continue to monitor per MD/PA/APRN orders; and reevaluate medication effectiveness within 30 minutes.   "

## 2024-02-28 NOTE — NURSING
"Pt seen by Dr Moulton in treatment team today, currently voicing no ADRs or physical complaints at this time, vital signs are stable, pt is not in any physical distress at the current time, currently voicing no suicidal or homicidal ideations at this time, reports decrease in depression and anxiety,  Voicing no hallucinations or delusions at this time, voicing no detox symptoms at this time, CIWA is 1, pt states, "Anxiety getting a little better.""It's ok.", pt's current plan is to go to a sober living facility if possible, possibly rehab if that doesn't work out, will monitor with suicide prec., for anger, psychosis and detox symptoms and will be assisted prn.  "

## 2024-02-28 NOTE — GROUP NOTE
Group Psychotherapy       Group Focus: Strength Training   Group topic: ?Strengths/ Positive Thinking. ?Therapist explored patients need for identifying personal strengths and qualities in working towards mental health goals.        Number of patients in attendance: 3    Group Start Time: 1045  Group End Time:  1130  Groups Date: 2/28/2024  Group Topic:  Behavioral Health  Group Department: [facility]  Group Facilitators:  Mag Campos MSW  _____________________________________________________________________    Patient Name: Dequan Woods  MRN: [id number]  Patient Class: IP- Psych   Admission Date\Time: 2/25/2024  3:00 PM  Hospital Length of Stay: 3  Primary Care Provider: Miranda Alas MD     Referred by: Acute Psychiatry Unit Treatment Team     Target symptoms: Alcohol Abuse, Depression, Anxiety, and Poor Coping Skills     Patient's response to treatment: Active Listening, Self-disclosure, Frequent Questions, and Feedback given to another patient     Progress toward goals: Progressing adequately     Interval History:      Diagnosis:      Plan: Continue treatment on APU

## 2024-02-29 PROCEDURE — S4991 NICOTINE PATCH NONLEGEND: HCPCS | Performed by: PSYCHIATRY & NEUROLOGY

## 2024-02-29 PROCEDURE — 11400000 HC PSYCH PRIVATE ROOM

## 2024-02-29 PROCEDURE — 25000003 PHARM REV CODE 250: Performed by: PSYCHIATRY & NEUROLOGY

## 2024-02-29 RX ORDER — PRAZOSIN HYDROCHLORIDE 1 MG/1
1 CAPSULE ORAL NIGHTLY
Qty: 30 CAPSULE | Refills: 0 | Status: SHIPPED | OUTPATIENT
Start: 2024-02-29 | End: 2024-03-30

## 2024-02-29 RX ORDER — SERTRALINE HYDROCHLORIDE 25 MG/1
125 TABLET, FILM COATED ORAL DAILY
Qty: 150 TABLET | Refills: 0 | Status: SHIPPED | OUTPATIENT
Start: 2024-03-01 | End: 2024-03-31

## 2024-02-29 RX ORDER — QUETIAPINE FUMARATE 300 MG/1
300 TABLET, FILM COATED ORAL NIGHTLY
Qty: 30 TABLET | Refills: 0 | Status: SHIPPED | OUTPATIENT
Start: 2024-02-29 | End: 2024-03-30

## 2024-02-29 RX ORDER — BUSPIRONE HYDROCHLORIDE 15 MG/1
15 TABLET ORAL 2 TIMES DAILY
Qty: 60 TABLET | Refills: 0 | Status: SHIPPED | OUTPATIENT
Start: 2024-02-29 | End: 2024-03-30

## 2024-02-29 RX ORDER — IBUPROFEN 200 MG
1 TABLET ORAL DAILY
Qty: 28 PATCH | Refills: 0 | Status: SHIPPED | OUTPATIENT
Start: 2024-03-01 | End: 2024-03-29

## 2024-02-29 RX ADMIN — HYDROXYZINE HYDROCHLORIDE 50 MG: 50 TABLET, FILM COATED ORAL at 09:02

## 2024-02-29 RX ADMIN — BUSPIRONE HYDROCHLORIDE 15 MG: 7.5 TABLET ORAL at 08:02

## 2024-02-29 RX ADMIN — NICOTINE 1 PATCH: 21 PATCH, EXTENDED RELEASE TRANSDERMAL at 08:02

## 2024-02-29 RX ADMIN — BUSPIRONE HYDROCHLORIDE 15 MG: 7.5 TABLET ORAL at 09:02

## 2024-02-29 RX ADMIN — PRAZOSIN HYDROCHLORIDE 1 MG: 1 CAPSULE ORAL at 09:02

## 2024-02-29 RX ADMIN — TRAZODONE HYDROCHLORIDE 100 MG: 100 TABLET ORAL at 09:02

## 2024-02-29 RX ADMIN — SERTRALINE HYDROCHLORIDE 125 MG: 50 TABLET ORAL at 08:02

## 2024-02-29 RX ADMIN — QUETIAPINE FUMARATE 300 MG: 300 TABLET ORAL at 09:02

## 2024-02-29 NOTE — NURSING
Pt is currently voicing no ADRs or physical complaints at this time, vital signs are stable, pt  Is not in any physical distress at the current time,  Currently voicing no suicidal or homicidal ideations at this time, voicing no hallucinations or delusions at this time, voicing no detox symptoms at this time, CIWA is 1, pt was seen today by   ERASMO Chaney, his plan is to go to a sober living facility,  Planned discharge for tomorrow.

## 2024-02-29 NOTE — GROUP NOTE
Group Psychotherapy       Group Focus: Spirituality and Well-Being   Group Topic: Mindfulness. Therapist explored patients need for increase in awareness of mindfulness, how it effects our moods and behaviors. Patients engaged in relaxation/meditation exercise.        Number of patients in attendance: 9    Group Start Time: 1045  Group End Time:  1130  Groups Date: 2/29/2024  Group Topic:  Behavioral Health  Group Department: [facility] - Behavioral Health Unit  Group Facilitators:  Mag Campos MSW  _____________________________________________________________________    Patient Name: Dequan Woods  MRN: [id number]  Patient Class: IP- Psych   Admission Date\Time: 2/25/2024  3:00 PM  Hospital Length of Stay: 4  Primary Care Provider: Miranda Alas MD     Referred by: Acute Psychiatry Unit Treatment Team     Target symptoms: Alcohol Abuse, Depression, Anxiety, and Poor Coping Skills     Patient's response to treatment: Active Listening, Self-disclosure, and Feedback given to another patient     Progress toward goals: Progressing adequately     Interval History:      Diagnosis:      Plan: Continue treatment on APU

## 2024-02-29 NOTE — PLAN OF CARE
02/29/24 [address] [place] [address] Group Therapy   Group Name Medication   Specific Interventions Other (see comments)  (Adherence)   Participation Level Active;Supportive;Appropriate;Attentive;Sharing   Participation Quality Social;Cooperative   Insight/Motivation Good;Applies New Skills   Affect/Mood Display Appropriate;Bright   Cognition Alert;Oriented   Psychomotor WNL

## 2024-02-29 NOTE — PLAN OF CARE
02/29/24 0952    Interventions   Interventions Benefits verification;Care Coordination;Chart/Obs Review;Communication with Preservice/Registration/AMOS;Medication Assistance/Authorization;Phone call/SC with MD;Payor Communication;Medical Necessity Research Time

## 2024-02-29 NOTE — PROGRESS NOTES
"2/29/2024  Dequan Woods   1980   [id number]        Psychiatry Progress Note     Chief Complaint: "Pretty good"    SUBJECTIVE:   Dequan Woods is a 43 y.o. male placed under a PEC at [facility] due to reported suicidal ideation.     Today patient states that he is feeling better. Vital signs have been stable.  No overt behavioral issues reported by staff.  Tolerating current medication regimen without issues. States that he is sleeping much better. Patient has some interviews with sober living houses today . Will f/u with staff on D/C plan. Will continue with current POC and plan for discharge tomorrow.      UDS: (+)cannabis  Blood alcohol: 25.1    Current Medications:   Scheduled Meds:    busPIRone  15 mg Oral BID    nicotine  1 patch Transdermal Daily    prazosin  1 mg Oral QHS    QUEtiapine  300 mg Oral QHS    sertraline  125 mg Oral Daily      PRN Meds: acetaminophen, aluminum-magnesium hydroxide-simethicone, haloperidoL **AND** diphenhydrAMINE **AND** LORazepam **AND** haloperidol lactate **AND** diphenhydrAMINE, hydrOXYzine HCL, magnesium hydroxide 400 mg/5 ml, ondansetron, traZODone   Psychotherapeutics (From admission, onward)      Start     Stop Route Frequency Ordered    02/28/24 0900  busPIRone tablet 15 mg         -- Oral 2 times daily 02/28/24 0735    02/26/24 2100  QUEtiapine tablet 300 mg         -- Oral Nightly 02/26/24 0741    02/26/24 0900  sertraline tablet 125 mg         -- Oral Daily 02/26/24 0741    02/25/24 1507  haloperidoL tablet 10 mg  (Med - Acute  Behavioral Management)        See Hyperspace for full Linked Orders Report.    -- Oral Every 4 hours PRN 02/25/24 1507    02/25/24 1507  LORazepam tablet 2 mg  (Med - Acute  Behavioral Management)        See Hyperspace for full Linked Orders Report.    -- Oral Every 4 hours PRN 02/25/24 1507    02/25/24 1507  haloperidol lactate injection 10 mg  (Med - Acute  Behavioral Management)        See Hyperspace for full Linked Orders Report.    " "-- IM Every 4 hours PRN 02/25/24 1507    02/25/24 1507  traZODone tablet 100 mg         -- Oral Nightly PRN 02/25/24 1507            Allergies:   Review of patient's allergies indicates:  No Known Allergies     OBJECTIVE:   Vitals   Vitals:    02/29/24 0701   BP: 119/78   Pulse: 68   Resp: 18   Temp: 97.5 °F (36.4 °C)        Labs/Imaging/Studies:   No results found for this or any previous visit (from the past 36 hour(s)).       Medical Review Of Systems:  Constitutional: negative  Respiratory: negative  Cardiovascular: negative  Gastrointestinal: negative  Genitourinary:negative  Musculoskeletal:negative  Neurological: negative       Psychiatric Mental Status Exam:  General Appearance: appears stated age, well-developed, well-nourished  Arousal: alert  Behavior: cooperative  Movements and Motor Activity: no abnormal involuntary movements noted  Orientation: oriented to person, place, time, and situation  Speech: normal rate, normal rhythm, normal volume, normal tone  Mood: "Pretty good"  Affect: constricted  Thought Process: linear  Associations: intact  Thought Content and Perceptions: suicidal ideation improving, no homicidal ideation, no auditory hallucinations, no visual hallucinations, no paranoid ideation, no ideas of reference  Recent and Remote Memory: recent memory intact, remote memory intact; per interview/observation with patient  Attention and Concentration: intact, attentive to conversation; per interview/observation with patient  Fund of Knowledge: intact, aware of current events, vocabulary appropriate; based on history, vocabulary, fund of knowledge, syntax, grammar, and content  Insight: questionable; based on understanding of severity of illness and HPI  Judgment: questionable; based on patient's behavior and HPI    ASSESSMENT/PLAN:   Problems Addressed/Diagnoses:  Major Depressive Disorder, recurrent, severe (F33.2)  Anxiety (F41.9)  Alcohol use disorder (F10.10)    No past medical history on " file.     Plan:  Depression, chronic with acute exacerbation  -Continue Zoloft  -Continue Seroquel     Anxiety (F41.9)  -Continue Buspar 15mg BID     Alcohol use, chronic with acute exacerbation  -Group/Individual psychotherapy  -Will monitor     -Ok to continue prazosin    Expected Disposition Plan:  Sober living        Dogu Onofre, [id number]

## 2024-02-29 NOTE — PLAN OF CARE
Problem: Adult Inpatient Plan of Care  Goal: Plan of Care Review  Outcome: Ongoing, Progressing     Problem: Activity and Energy Impairment (Depressive Signs/Symptoms)  Goal: Optimized Energy Level (Depressive Signs/Symptoms)  Outcome: Ongoing, Progressing     Problem: Cognitive Impairment (Depressive Signs/Symptoms)  Goal: Optimized Cognitive Function  Outcome: Ongoing, Progressing     Problem: Decreased Participation/Engagement (Depressive Signs/Symptoms)  Goal: Increased Participation and Engagement (Depressive Signs/Symptoms)  Outcome: Ongoing, Progressing     Problem: Feelings of Worthlessness, Hopelessness or Excessive Guilt (Depressive Signs/Symptoms)  Goal: Enhanced Self-Esteem and Confidence (Depressive Signs/Symptoms)  Outcome: Ongoing, Progressing     Problem: Mood Impairment (Depressive Signs/Symptoms)  Goal: Improved Mood Symptoms (Depressive Signs/Symptoms)  Outcome: Ongoing, Progressing

## 2024-02-29 NOTE — PLAN OF CARE
Dequan met reported treatment goals of coping skills and stress management.  CTRS Discharge Recommendations:  Encouraged Pt. to actively utilize available community resources to increase leisure involvement to decrease signs and symptoms of illness.  Encouraged Pt. to utilize coping skills on a regular basis to reduce the risk of decomposition and re-hospitalization.

## 2024-03-01 VITALS
WEIGHT: 221 LBS | SYSTOLIC BLOOD PRESSURE: 137 MMHG | HEART RATE: 79 BPM | BODY MASS INDEX: 28.36 KG/M2 | HEIGHT: 74 IN | DIASTOLIC BLOOD PRESSURE: 86 MMHG | RESPIRATION RATE: 18 BRPM | OXYGEN SATURATION: 98 % | TEMPERATURE: 98 F

## 2024-03-01 PROCEDURE — 25000003 PHARM REV CODE 250: Performed by: PSYCHIATRY & NEUROLOGY

## 2024-03-01 RX ADMIN — SERTRALINE HYDROCHLORIDE 125 MG: 50 TABLET ORAL at 08:03

## 2024-03-01 RX ADMIN — BUSPIRONE HYDROCHLORIDE 15 MG: 7.5 TABLET ORAL at 08:03
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
## 2024-03-01 NOTE — NURSING
Pt is scheduled for discharge today, currently voicing no ADRs or physical complaints at this time, vital signs are stable, pt is not in any physical distress at the current time, pt was seen by ERASMO Chaney, currently voicing no suicidal or homicidal ideations at this time, voicing no hallucinations or delusions at this time, voicing no detox symptoms at this time, pt was given dc instructions, voiced understanding, pt will receive a dc packet which will contain RX for dc meds, aftercare appt, lab work and educational material,  Pt will be going to a sober living facility in [place], [place], he will need transportation, SW will set this up, belongings were packed by t, pt will be brought up to dc area when transportation arrives.
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
## 2024-03-01 NOTE — PROGRESS NOTES
"3/1/2024  Dequan Woods   1980   [id number]        Psychiatry Progress Note     Chief Complaint: "Pretty good"    SUBJECTIVE:   Dequan Woods is a 43 y.o. male placed under a PEC at [facility] due to reported suicidal ideation.     Today patient states that he is feeling better. Vital signs remain stable.  No overt behavioral issues reported by staff.  Tolerating current medication regimen without issues. States that he is eating and sleeping well. Patient will be going to sober living house and will begin meetings for his sobriety. Will continue with current POC and proceed with discharge today.      UDS: (+)cannabis  Blood alcohol: 25.1    Current Medications:   Scheduled Meds:    busPIRone  15 mg Oral BID    nicotine  1 patch Transdermal Daily    prazosin  1 mg Oral QHS    QUEtiapine  300 mg Oral QHS    sertraline  125 mg Oral Daily      PRN Meds: acetaminophen, aluminum-magnesium hydroxide-simethicone, haloperidoL **AND** diphenhydrAMINE **AND** LORazepam **AND** haloperidol lactate **AND** diphenhydrAMINE, hydrOXYzine HCL, magnesium hydroxide 400 mg/5 ml, ondansetron, traZODone   Psychotherapeutics (From admission, onward)      Start     Stop Route Frequency Ordered    02/28/24 0900  busPIRone tablet 15 mg         -- Oral 2 times daily 02/28/24 0735    02/26/24 2100  QUEtiapine tablet 300 mg         -- Oral Nightly 02/26/24 0741    02/26/24 0900  sertraline tablet 125 mg         -- Oral Daily 02/26/24 0741    02/25/24 1507  haloperidoL tablet 10 mg  (Med - Acute  Behavioral Management)        See Hyperspace for full Linked Orders Report.    -- Oral Every 4 hours PRN 02/25/24 1507    02/25/24 1507  LORazepam tablet 2 mg  (Med - Acute  Behavioral Management)        See Hyperspace for full Linked Orders Report.    -- Oral Every 4 hours PRN 02/25/24 1507    02/25/24 1507  haloperidol lactate injection 10 mg  (Med - Acute  Behavioral Management)        See Hyperspace for full Linked Orders Report.    -- IM " "Every 4 hours PRN 02/25/24 1507    02/25/24 1507  traZODone tablet 100 mg         -- Oral Nightly PRN 02/25/24 1507            Allergies:   Review of patient's allergies indicates:  No Known Allergies     OBJECTIVE:   Vitals   Vitals:    02/29/24 1100   BP: 124/83   Pulse: 94   Resp: 18   Temp: 97.3 °F (36.3 °C)        Labs/Imaging/Studies:   No results found for this or any previous visit (from the past 36 hour(s)).       Medical Review Of Systems:  Constitutional: negative  Respiratory: negative  Cardiovascular: negative  Gastrointestinal: negative  Genitourinary:negative  Musculoskeletal:negative  Neurological: negative       Psychiatric Mental Status Exam:  General Appearance: appears stated age, well-developed, well-nourished  Arousal: alert  Behavior: cooperative  Movements and Motor Activity: no abnormal involuntary movements noted  Orientation: oriented to person, place, time, and situation  Speech: normal rate, normal rhythm, normal volume, normal tone  Mood: "Pretty good"  Affect: constricted  Thought Process: linear  Associations: intact  Thought Content and Perceptions: suicidal ideation improving, no homicidal ideation, no auditory hallucinations, no visual hallucinations, no paranoid ideation, no ideas of reference  Recent and Remote Memory: recent memory intact, remote memory intact; per interview/observation with patient  Attention and Concentration: intact, attentive to conversation; per interview/observation with patient  Fund of Knowledge: intact, aware of current events, vocabulary appropriate; based on history, vocabulary, fund of knowledge, syntax, grammar, and content  Insight: questionable; based on understanding of severity of illness and HPI  Judgment: questionable; based on patient's behavior and HPI    ASSESSMENT/PLAN:   Problems Addressed/Diagnoses:  Major Depressive Disorder, recurrent, severe (F33.2)  Anxiety (F41.9)  Alcohol use disorder (F10.10)    No past medical history on file. "     Plan:  Depression, chronic with acute exacerbation  -Continue Zoloft  -Continue Seroquel     Anxiety (F41.9)  -Continue Buspar 15mg BID     Alcohol use, chronic with acute exacerbation  -Group/Individual psychotherapy  -Will monitor     -Ok to continue prazosin    Expected Disposition Plan:  Sober living        Doug Onofre, PATRICIAP-BC

## 2024-03-01 NOTE — CARE UPDATE
Transportation has been set up via [facility]. Patient is going to [address] [place] [address]

## 2024-03-01 NOTE — NURSING
Pt sitting in the dayroom attempting to intimidate his peer by staring at him. MHT reports that he made a statement about people who snitch getting 20 years in [facility]. Instructed to discontinue this behavior. Pt laughs ad continue talking with other peers.

## 2024-03-24 ENCOUNTER — HOSPITAL ENCOUNTER (EMERGENCY)
Facility: HOSPITAL | Age: 44
Discharge: HOME | End: 2024-03-24
Attending: EMERGENCY MEDICINE
Payer: MEDICAID

## 2024-03-24 VITALS
WEIGHT: 220 LBS | HEIGHT: 73 IN | RESPIRATION RATE: 18 BRPM | DIASTOLIC BLOOD PRESSURE: 84 MMHG | BODY MASS INDEX: 29.16 KG/M2 | SYSTOLIC BLOOD PRESSURE: 142 MMHG | TEMPERATURE: 98 F | HEART RATE: 78 BPM | OXYGEN SATURATION: 97 %

## 2024-03-24 DIAGNOSIS — L03.114 CELLULITIS OF LEFT UPPER EXTREMITY: Primary | ICD-10-CM

## 2024-03-24 LAB
ALBUMIN SERPL-MCNC: 3.9 G/DL (ref 3.5–5)
ALBUMIN/GLOB SERPL: 1.4 RATIO (ref 1.1–2)
ALP SERPL-CCNC: 79 UNIT/L (ref 40–150)
ALT SERPL-CCNC: 7 UNIT/L (ref 0–55)
AST SERPL-CCNC: 16 UNIT/L (ref 5–34)
BASOPHILS # BLD AUTO: 0.07 X10(3)/MCL
BASOPHILS NFR BLD AUTO: 0.6 %
BILIRUB SERPL-MCNC: 0.2 MG/DL
BUN SERPL-MCNC: 12 MG/DL (ref 8.9–20.6)
CALCIUM SERPL-MCNC: 9.3 MG/DL (ref 8.4–10.2)
CHLORIDE SERPL-SCNC: 110 MMOL/L (ref 98–107)
CO2 SERPL-SCNC: 18 MMOL/L (ref 22–29)
CREAT SERPL-MCNC: 1.07 MG/DL (ref 0.73–1.18)
EOSINOPHIL # BLD AUTO: 0.31 X10(3)/MCL (ref 0–0.9)
EOSINOPHIL NFR BLD AUTO: 2.6 %
ERYTHROCYTE [DISTWIDTH] IN BLOOD BY AUTOMATED COUNT: 12.4 % (ref 11.5–17)
GFR SERPLBLD CREATININE-BSD FMLA CKD-EPI: >60 MLS/MIN/1.73/M2
GLOBULIN SER-MCNC: 2.8 GM/DL (ref 2.4–3.5)
GLUCOSE SERPL-MCNC: 90 MG/DL (ref 74–100)
HCT VFR BLD AUTO: 42.7 % (ref 42–52)
HGB BLD-MCNC: 14.1 G/DL (ref 14–18)
IMM GRANULOCYTES # BLD AUTO: 0.04 X10(3)/MCL (ref 0–0.04)
IMM GRANULOCYTES NFR BLD AUTO: 0.3 %
LYMPHOCYTES # BLD AUTO: 3.03 X10(3)/MCL (ref 0.6–4.6)
LYMPHOCYTES NFR BLD AUTO: 25.1 %
MCH RBC QN AUTO: 31.2 PG (ref 27–31)
MCHC RBC AUTO-ENTMCNC: 33 G/DL (ref 33–36)
MCV RBC AUTO: 94.5 FL (ref 80–94)
MONOCYTES # BLD AUTO: 0.85 X10(3)/MCL (ref 0.1–1.3)
MONOCYTES NFR BLD AUTO: 7.1 %
NEUTROPHILS # BLD AUTO: 7.75 X10(3)/MCL (ref 2.1–9.2)
NEUTROPHILS NFR BLD AUTO: 64.3 %
NRBC BLD AUTO-RTO: 0 %
PLATELET # BLD AUTO: 266 X10(3)/MCL (ref 130–400)
PMV BLD AUTO: 9.1 FL (ref 7.4–10.4)
POTASSIUM SERPL-SCNC: 4 MMOL/L (ref 3.5–5.1)
PROT SERPL-MCNC: 6.7 GM/DL (ref 6.4–8.3)
RBC # BLD AUTO: 4.52 X10(6)/MCL (ref 4.7–6.1)
SODIUM SERPL-SCNC: 138 MMOL/L (ref 136–145)
WBC # SPEC AUTO: 12.05 X10(3)/MCL (ref 4.5–11.5)

## 2024-03-24 PROCEDURE — 99284 EMERGENCY DEPT VISIT MOD MDM: CPT

## 2024-03-24 PROCEDURE — 80053 COMPREHEN METABOLIC PANEL: CPT

## 2024-03-24 PROCEDURE — 85025 COMPLETE CBC W/AUTO DIFF WBC: CPT

## 2024-03-24 RX ORDER — MUPIROCIN 20 MG/G
OINTMENT TOPICAL 3 TIMES DAILY
Qty: 22 G | Refills: 0 | Status: SHIPPED | OUTPATIENT
Start: 2024-03-24 | End: 2024-03-31

## 2024-03-24 RX ORDER — SULFAMETHOXAZOLE AND TRIMETHOPRIM 800; 160 MG/1; MG/1
1 TABLET ORAL 2 TIMES DAILY
Qty: 20 TABLET | Refills: 0 | Status: SHIPPED | OUTPATIENT
Start: 2024-03-24 | End: 2024-04-03

## 2024-03-25 NOTE — FIRST PROVIDER EVALUATION
"Medical screening examination initiated.  I have conducted a focused provider triage encounter, findings are as follows:    Brief history of present illness:  arrived to ED due to possible spider bite to L FA. Reports he cut open the area on yesterday with one of his knives. Denies fever.    Vitals:    03/24/24 1957   BP: (!) 142/84   Pulse: 78   Resp: 18   Temp: 98.2 °F (36.8 °C)   SpO2: 97%   Weight: 99.8 kg (220 lb)   Height: 6' 1" (1.854 m)       Pertinent physical exam:  awake, alert, has non-labored breathing, redness/swelling/tenderness to L FA.    Brief workup plan:  labs     Preliminary workup initiated; this workup will be continued and followed by the physician or advanced practice provider that is assigned to the patient when roomed.  "

## 2024-03-25 NOTE — ED PROVIDER NOTES
Encounter Date: 3/24/2024       History     Chief Complaint   Patient presents with    Abscess     Pt c/o possible insect bite that developed into abscess to L FA x1 week. Pt states that he cut it open with his pocket knife yesterday to allow it to drain. Woke up today w/ increased swelling and pain. L FA swollen and erythematous on arrival. Denies recent fever     See MDM    The history is provided by the patient. No  was used.     Review of patient's allergies indicates:  No Known Allergies  Past Medical History:   Diagnosis Date    Depression      Past Surgical History:   Procedure Laterality Date    none       History reviewed. No pertinent family history.  Social History     Tobacco Use    Smoking status: Every Day     Types: Cigarettes    Smokeless tobacco: Never   Substance Use Topics    Alcohol use: Not Currently    Drug use: Not Currently     Review of Systems   Constitutional:  Negative for fever.   Respiratory:  Negative for cough and shortness of breath.    Cardiovascular:  Negative for chest pain.   Gastrointestinal:  Negative for abdominal pain.   Genitourinary:  Negative for difficulty urinating and dysuria.   Musculoskeletal:  Negative for gait problem.   Skin:  Negative for color change.   Neurological:  Negative for dizziness, speech difficulty and headaches.   Psychiatric/Behavioral:  Negative for hallucinations and suicidal ideas.    All other systems reviewed and are negative.      Physical Exam     Initial Vitals [03/24/24 1957]   BP Pulse Resp Temp SpO2   (!) 142/84 78 18 98.2 °F (36.8 °C) 97 %      MAP       --         Physical Exam    Nursing note and vitals reviewed.  Constitutional: He appears well-developed and well-nourished.   HENT:   Head: Normocephalic.   Eyes: EOM are normal.   Neck: Neck supple.   Normal range of motion.  Cardiovascular:  Normal rate, regular rhythm, normal heart sounds and intact distal pulses.           Pulmonary/Chest: Breath sounds normal.    Abdominal: Abdomen is soft. Bowel sounds are normal.   Musculoskeletal:         General: Normal range of motion.      Cervical back: Normal range of motion and neck supple.     Neurological: He is alert and oriented to person, place, and time. He has normal strength.   Skin: Skin is warm and dry. Capillary refill takes less than 2 seconds.   Small area of swelling and erythema to left forearm    Psychiatric: He has a normal mood and affect. His behavior is normal. Judgment and thought content normal.         ED Course   Procedures  Labs Reviewed   COMPREHENSIVE METABOLIC PANEL - Abnormal; Notable for the following components:       Result Value    Chloride 110 (*)     Carbon Dioxide 18 (*)     All other components within normal limits   CBC WITH DIFFERENTIAL - Abnormal; Notable for the following components:    WBC 12.05 (*)     RBC 4.52 (*)     MCV 94.5 (*)     MCH 31.2 (*)     All other components within normal limits   CBC W/ AUTO DIFFERENTIAL    Narrative:     The following orders were created for panel order CBC auto differential.  Procedure                               Abnormality         Status                     ---------                               -----------         ------                     CBC with Differential[[id number]]       Abnormal            Final result                 Please view results for these tests on the individual orders.          Imaging Results    None          Medications - No data to display  Medical Decision Making  Historian:  Patient.  Patient is a 43-year-old male  that presents with possible insect bite to left forearm that has been present 1 week. Associated symptoms nothing. 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 Exacerbated by nothing. Relieved by nothing. Patient treatment prior to arrival none. Risk factors include none. Other history pertaining to this complaint nothing.   Assessment:  See physical exam.  DD:  Insect bite, cellulitis, abscess  ED  Course: History was obtained.  Physical was performed.  Patient does have area of swelling and erythema to left forearm.  This area is indurated and does not need or require incision and drainage at present time.  We will try Bactrim and Bactroban.  Medical or surgical consults:  None. Social determinants that affect healthcare:  None.       Amount and/or Complexity of Data Reviewed  Labs:      Details: Mild elevation of white blood cell count    Risk  Prescription drug management.  Risk Details: Bactrim and Bactroban                                      Clinical Impression:  Final diagnoses:  [L03.114] Cellulitis of left upper extremity (Primary)          ED Disposition Condition    Discharge Stable          ED Prescriptions       Medication Sig Dispense Start Date End Date Auth. Provider    mupirocin (BACTROBAN) 2 % ointment Apply topically 3 (three) times daily. for 7 days 22 g 3/24/2024 3/31/2024 Flynn Joseph FNP    sulfamethoxazole-trimethoprim 800-160mg (BACTRIM DS) 800-160 mg Tab Take 1 tablet by mouth 2 (two) times daily. for 10 days 20 tablet 3/24/2024 4/3/2024 Flynn Joseph FNP          Follow-up Information    None          Flynn Joseph FNP  03/24/24 2039

## 2024-03-25 NOTE — ED TRIAGE NOTES
Pt c/o possible insect bite that developed into abscess to L FA x1 week. Pt states that he cut it open with his pocket knife yesterday to allow it to drain. Woke up today w/ increased swelling and pain. L FA swollen and erythematous on arrival. Denies recent fever